# Patient Record
Sex: FEMALE | Race: WHITE | Employment: FULL TIME | URBAN - METROPOLITAN AREA
[De-identification: names, ages, dates, MRNs, and addresses within clinical notes are randomized per-mention and may not be internally consistent; named-entity substitution may affect disease eponyms.]

---

## 2017-01-25 ENCOUNTER — HOSPITAL ENCOUNTER (OUTPATIENT)
Dept: PHYSICAL THERAPY | Age: 47
Discharge: HOME | End: 2017-01-25
Payer: COMMERCIAL

## 2017-01-25 PROCEDURE — 97033 APP MDLTY 1+IONTPHRSIS EA 15: CPT

## 2017-01-25 PROCEDURE — 97161 PT EVAL LOW COMPLEX 20 MIN: CPT

## 2017-01-25 PROCEDURE — 97140 MANUAL THERAPY 1/> REGIONS: CPT

## 2017-01-25 NOTE — PROGRESS NOTES
[address] Gle[address]  [facility]   [address]  [facility]  [facility] [address] [place], [address] - Phone: [phone]  Fax: [phone] / [address]  Patient Name: Amy Vilella : 1970   Medical   Diagnosis: Right elbow pain [M25.521] Treatment Diagnosis: R elbow pain   Onset Date: chronic     Referral Source: Shyam Gonzalez MD Start of [facility]): 2017   Prior Hospitalization: See medical history Provider #: [id number]   Prior Level of Function: Able to carry, lift, hold objects without pain   Comorbidities: Chronic R shoulder pain, mild c/s degeneration   Medications: Verified on Patient Summary List   The Plan of Care and following information is based on the information from the initial evaluation.   ===========================================================================================  Assessment / key information:  The patient is a 51 y.o. female who presents to the clinic with main c/o R elbow and UE pain. Pain level is reported as 6-7/10. Objective evaluation reveals: (1) FOTO=41. (2) FHP and increased t/s kyphosis. (3) c/s AROM: patient generally hypermobile as evident by >WNL BB, FHP limiting BROT. (4) R GHJ AROM WNL, but patient reports \"burning\" in post scapula after 5 min on computer. B elbow ROM -6-145. (5) MSTT: strong and painful ECRL and ECRB. ED WNL. (6) TrPs present in wrist extensors as well as sh mm of UT and supra/infraspinatus. Decreased pain to 2/10 following manual TPR and STM to R UE. Followed manual with iontophoresis. Patient is a good dry needling candidate. These impairments are limiting activity tolerance, patient UA to spend >5 min on computer, UA to hold a coffee cup. The patient would benefit from skilled physical therapy services in order to improve above listed impairments for return to PLOF. ===========================================================================================  Eval Complexity: History LOW Complexity : Zero comorbidities / personal factors that will impact the outcome / POC;  Examination  HIGH Complexity : 4+ Standardized tests and measures addressing body structure, function, activity limitation and / or participation in recreation ; Presentation MEDIUM Complexity : Evolving with changing characteristics ; Decision Making MEDIUM Complexity : FOTO score of 26-74; Overall Complexity LOW   Problem List: pain affecting function, decrease ROM, decrease strength, decrease ADL/ functional abilitiies and decrease activity tolerance FOTO = 41  Treatment Plan may include any combination of the following: Therapeutic exercise, Therapeutic activities, Neuromuscular re-education, Physical agent/modality, Patient education and Self Care training, and manual therapy, including myofascial trigger point dry needling (TDN). Patient / Family readiness to learn indicated by: asking questions, trying to perform skills and interest  Persons(s) to be included in education: patient (P); patient support person, NA  Barriers to Learning/Limitations: None  Measures taken: NA   Patient Goal (s): \"no pain and origination\"   Patient self reported health status: excellent  Rehabilitation Potential: good   Short Term Goals: To be accomplished in  2-3  weeks:  1. Establish HEP and consistent compliance with instructions. 2. Patient to report >25% improvement in overall symptoms. 3. Improve FOTO score to > or 48/100 to demo improved function.  Long Term Goals: To be accomplished in  4-6  weeks:  1. The patient will be independent in HEP in preparation for discharge. 2. Improve functional ability as evidenced by a score of > or = 55/100 on FOTO. 3. Patient to report >50% improvement in overall symptoms. 4. Patient will be able to hold a coffee cup without feelings of fatigue, pain or weakness.   5. The patient will report > or = 10 min on the computer before burning sensation or pain in R UE/scapular area. Frequency / Duration:   Patient to be seen  2  times per week for 4-6  weeks:  Patient / Caregiver education and instruction: self care, activity modification and exercises    Therapist Signature: Lakesha Quan PT, DPT, MTC, CMTPT Date: 2/77/6103   Certification Period: na Time: 3:20 PM   ===========================================================================================  I certify that the above Physical Therapy Services are being furnished while the patient is under my care. I agree with the treatment plan and certify that this therapy is necessary. Physician Signature:        Date:       Time:     Please sign and return to [facility] or you may fax the signed copy to [phone]. Thank you.

## 2017-01-27 NOTE — PROGRESS NOTES
PHYSICAL THERAPY - DAILY TREATMENT NOTE    Patient Name: Ami Gallagher        Date: 2017  : 1970    Patient  Verified: YES  Visit #:   1     Insurance: Payor: Felipa Aguilar / Plan: [employer] [place] [employer] / Product Type: HMO /      In time: 242 Out time: 115   Total Treatment Time: 45     Medicare Time Tracking (below)   Total Timed Codes (min):   1:1 Treatment Time:       TREATMENT AREA/ DIAGNOSIS = Right elbow pain [M25.521]    SUBJECTIVE  Pain Level (on 0 to 10 scale):  -7  / 10   Medication Changes/New allergies or changes in medical history, any new surgeries or procedures?     NO    If yes, update Summary List   Subjective Functional Status/Changes:  []  No changes reported     Please see eval.      OBJECTIVE  Modalities Rationale: [] decrease edema/ inflammation  [] decrease pain   [] increase tissue extensibility  [] increase muscle performance  [] decrease neural compromise  to improve patient's ability to [] perform ADLs   [] ambulate  [] perform work  [] relaxation/ sleep      min [] Estim, type/location:                                      []  att     []  unatt     []  w/US     []  w/ice    []  w/heat    min []  Mechanical Traction: type/lbs                   []  pro   []  sup   []  int   []  cont    []  before manual    []  after manual    min []  Ultrasound, settings/location:     5 min [x]  Iontophoresis w/ dexamethasone, location: R wrist extensors                                              [x]  take home patch       []  in clinic    min []  Ice     []  Heat    location/position:     min []  Vasopneumatic Device, press/temp:     min []  Other:    [] Skin assessment post-treatment (if applicable):    []  intact    []  redness- no adverse reaction     []redness  adverse reaction:          min Therapeutic Exercise:  [x]  See flow sheet   Rationale:  [] increase ROM   [] increase strength   [] increase endurance   [] increase motor control   [] other  to improve patients ability to [] perform ADLs   [] ambulate  [] perform work  [] relaxation/ sleep      15 min Manual Therapy: Technique:         [] STM[x]ASTM[]TPR[]PROM[] Stretching  []Jt manipulation []Gr I [] II []  III [] IV[] V[]  Treatment Area:  R forarm, UT, infraspinatus  Other: manual TPR   Rationale: [x] decrease pain   [x] decrease TP [x] increase ROM/ mobility   [] increase tissue extensibility   [] decrease edema   [] reduce disc [] postural correction   [] other     to improve patient's ability to [x] perform ADLs   [] ambulate  [] perform work  [] relaxation/ sleep       min Therapeutic Activity:  [] see flow sheet   Rationale:[] increase ROM   [] increase strength   [] increase balance/ proprioception   [] increase motor control   [] other   to improve patients ability to [] perform ADLs   [] ambulate  [] perform work  [] relaxation/ sleep      min Neuromuscular Re-ed:  [] see flow sheet   Rationale:[] increase ROM   [] increase strength   [] increase balance/ proprioception   [] increase motor control  [] improve safety  [] other    to improve patients ability to[] perform ADLs   [] ambulate  [] perform work  [] relaxation/ sleep                min Gait Training: To improve patients ability to:    [] perform ADLs   [] ambulate       min Patient Education:  Yes    [x] Reviewed HEP   []  Progressed/Changed HEP based on: Other Objective/Functional Measures:    Please see POC. Post Treatment Pain Level (on 0 to 10) scale:   2  / 10     ASSESSMENT  []  See Progress Note/Recertification      Patient will continue to benefit from skilled PT services to:  PLEASE SEE POC/SET GOALS.    Progress toward goals / Updated goals:    [] decr pain   []inc stability   []inc balance [] inc ROM[] inc strength  [] centralizing symptoms                    [] progressing  Function  [] progressing towards LTGs            []  progressing HEP       PLAN  [x]  Upgrade activities as tolerated YES Continue plan of care   []  Discharge due to : []  Other:      Therapist: Audley Mortimer, PT, DPT, MTC, CMTPT    Date: 1/27/2017 Time: 4:21 PM        Future Appointments  Date Time Provider Nicolette Santoro   1/31/2017 9:00 AM John Alvarado PT [facility]   2/2/2017 9:00 AM John Alvarado PT [facility] AT [facility]   2/7/2017 10:00 AM John Alvarado PT [facility] AT [facility]   2/14/2017 2:30 PM John Alvarado PT [facility] AT [facility]   2/21/2017 9:00 AM John Alvarado PT [facility] AT [facility]   2/28/2017 9:00 AM John Alvarado PT [facility] AT [facility]

## 2017-01-31 ENCOUNTER — HOSPITAL ENCOUNTER (OUTPATIENT)
Dept: PHYSICAL THERAPY | Age: 47
Discharge: HOME | End: 2017-01-31
Payer: COMMERCIAL

## 2017-01-31 PROCEDURE — 97140 MANUAL THERAPY 1/> REGIONS: CPT

## 2017-01-31 PROCEDURE — 97110 THERAPEUTIC EXERCISES: CPT

## 2017-01-31 NOTE — PROGRESS NOTES
PHYSICAL THERAPY - DAILY TREATMENT NOTE      Patient Name: Alcon Bynum        Date: 2017  : 1970   YES Patient  Verified  Visit #:   2   of     Insurance: Payor: Joey Rangel / Plan: [address] [place] [employer] / Product Type: HMO /      In time: 9:00 Out time: 10:05   Total Treatment Time: 65     Medicare Time Tracking (below)   Total Timed Codes (min):   1:1 Treatment Time:       TREATMENT AREA = Right elbow pain [M25.521]    SUBJECTIVE    Pain Level (on 0 to 10 scale):  3  / 10   Medication Changes/New allergies or changes in medical history, any new surgeries or procedures? NO    If yes, update Summary List   Subjective Functional Status/Changes:  []  No changes reported     \"Working on the computer, using my phone, and reaching for things bothers it. \"       OBJECTIVE    25 min Therapeutic Exercise:  [x]  See flow sheet   Rationale:      increase ROM and increase strength to improve the patients ability to work on the computer       30 min Manual Therapy:  TrPR R infraspinatus, wrist extensors, Stretching wrist extensors   Rationale:      decrease pain, increase ROM, increase tissue extensibility and decrease trigger points to improve patient's ability to work on the computer    Modalities Rationale:     decrease pain to improve patient's ability to work on the computer      min [] Estim, type/location:                                      []  att     []  unatt     []  w/US     []  w/ice    []  w/heat    min []  Mechanical Traction: type/lbs                   []  pro   []  sup   []  int   []  cont    []  before manual    []  after manual    min []  Ultrasound, settings/location:      min []  Iontophoresis w/ dexamethasone, location:                                               []  take home patch       []  in clinic   10 min []  Ice     [x]  Heat    location/position: R shoulder; supine    min []  Vasopneumatic Device, press/temp:     min []  Other:    [] Skin assessment post-treatment (if applicable):    [x]  intact    []  redness- no adverse reaction     []redness  adverse reaction:       min Patient Education:  YES  Reviewed HEP   []  Progressed/Changed HEP based on: Other Objective/Functional Measures: TrPs in infraspinatus reproduced pain down R UE     Post Treatment Pain Level (on 0 to 10) scale:   0  / 10     ASSESSMENT    Assessment/Changes in Function:     Pt with sig TrPs in infraspinatus which are likely contributing to UE pain. TrPs also present in wrist extensors. Pt is a prime TDN candidate-awaiting MD signature on POC. []  See Progress Note/Recertification   Patient will continue to benefit from skilled PT services to modify and progress therapeutic interventions, address functional mobility deficits, address ROM deficits, address strength deficits, analyze and address soft tissue restrictions, analyze and cue movement patterns, analyze and modify body mechanics/ergonomics, assess and modify postural abnormalities and instruct in home and community integration to attain remaining goals. to attain remaining goals.    Progress toward goals / Updated goals:    Establish HEP and consistent compliance with instructions-met     PLAN    []  Upgrade activities as tolerated YES Continue plan of care   []  Discharge due to :    []  Other:      Therapist: Marichuy Del Rio PT    Date: 1/31/2017 Time: 9:01 AM   Future Appointments  Date Time Provider Nicolette Santoro   2/2/2017 9:00 AM [facility]   2/10/2017 8:30 AM [facility]   2/14/2017 2:30 PM [facility]   2/21/2017 9:00 AM [facility]   2/28/2017 9:00 AM Mar[facility] Del Rio [facility]

## 2017-02-02 ENCOUNTER — HOSPITAL ENCOUNTER (OUTPATIENT)
Dept: PHYSICAL THERAPY | Age: 47
Discharge: HOME | End: 2017-02-02
Payer: COMMERCIAL

## 2017-02-02 PROCEDURE — 97140 MANUAL THERAPY 1/> REGIONS: CPT

## 2017-02-02 PROCEDURE — 97110 THERAPEUTIC EXERCISES: CPT

## 2017-02-02 NOTE — PROGRESS NOTES
PHYSICAL THERAPY - DAILY TREATMENT NOTE      Patient Name: Nasima Glez        Date: 2017  : 1970   YES Patient  Verified  Visit #:   3   of     Insurance: Payor: Mario Harris / Plan: [employer] [place] HMO / Product Type: HMO /      In time: 9:00 Out time: 9:35   Total Treatment Time: 35     Medicare Time Tracking (below)   Total Timed Codes (min):   1:1 Treatment Time:       TREATMENT AREA = Right elbow pain [M25.521]    SUBJECTIVE    Pain Level (on 0 to 10 scale):  4  / 10   Medication Changes/New allergies or changes in medical history, any new surgeries or procedures? NO    If yes, update Summary List   Subjective Functional Status/Changes:  []  No changes reported     \"It's feeling a little better today. \"       OBJECTIVE    20 min Therapeutic Exercise:  [x]  See flow sheet   Rationale:      increase ROM and increase strength to improve the patients ability to work on the computer       15 min Manual Therapy:  TDN R infraspinatus   Rationale:      decrease pain, increase ROM, increase tissue extensibility and decrease trigger points to improve patient's ability to work on the computer       min Patient Education:  Mely Bethea   []  Progressed/Changed HEP based on: Other Objective/Functional Measures: Other Objective/Functional Measures:    Dry Needling Procedure Note    Dry Needle Session Number:  1    Procedure: An intramuscular manual therapy (dry needling) and a neuro-muscular re-education treatment was done to deactivate myofascial trigger points, with a 30/50 and 30/30 gauge solid filament needle, under aseptic technique.     Indication(s): [] Muscle spasms [] Myalgia/Myositis  [] Muscle cramps      [] Muscle imbalances [] TMD (TMJ) [x] Myofascial pain & dysfunction     [] Other: _muscle stiffness_    TIMEOUT PERFORMED:  9:20 (enter time the timeout was completed)   Marielos Ponce (enter who was present)    Informed Consent Obtained: [x] Verbal  [x] Written (obtained at first needling session)  The following items were reviewed with the patient:   Purpose of dry needling, side effects, possible complications, and the informed consent    The need to report the use of blood thinners and/or immunosuppressant medications    How to respond to possible adverse effects of the treatment   Self treatment of post needling soreness: heat (moist heat, heat wraps) and stretching   Opportunity was given to ask any questions, all questions were answered    Treatment:  The following muscles were treated today:    Right: infraspinatus   Left: n/a     Patients response to todays treatment:   [x]  LTRs  [x]  Muscle Relaxation  [x]  Pain Relief  []  Decreased Tinnitus  []  Decreased HAs [x]  Post needling soreness []  Increased ROM   []  Other:             Post Treatment Pain Level (on 0 to 10) scale:   0  / 10     ASSESSMENT    Assessment/Changes in Function:     TDN to R infraspinatus reproduced wrist and elbow pain. Decreased pain following treatment. Plan to needle R wrist extensors next session if pain hasn't resolved. []  See Progress Note/Recertification   Patient will continue to benefit from skilled PT services to modify and progress therapeutic interventions, address functional mobility deficits, address ROM deficits, address strength deficits, analyze and address soft tissue restrictions, analyze and cue movement patterns and instruct in home and community integration to attain remaining goals. to attain remaining goals.    Progress toward goals / Updated goals:    Patient to report >25% improvement in overall symptoms-progressing     PLAN    []  Upgrade activities as tolerated YES Continue plan of care   []  Discharge due to :    []  Other:      Therapist: Radha Flannery PT    Date: 2/2/2017 Time: 8:56 AM   Future Appointments  Date Time Provider Nicolette Santoro   2/2/2017 9:00 AM Radha Flannery PT [facility]   2/10/2017 8:30 AM Radha Flannery PT [facility] 2/14/2017 2:30 PM Vlad Diaz PT [facility] AT [facility]   2/21/2017 9:00 AM Vlad Diaz PT [facility]   2/28/2017 9:00 AM Vlad Diaz PT [facility]

## 2017-02-07 ENCOUNTER — APPOINTMENT (OUTPATIENT)
Dept: PHYSICAL THERAPY | Age: 47
End: 2017-02-07
Payer: COMMERCIAL

## 2017-02-10 ENCOUNTER — HOSPITAL ENCOUNTER (OUTPATIENT)
Dept: PHYSICAL THERAPY | Age: 47
Discharge: HOME | End: 2017-02-10
Payer: COMMERCIAL

## 2017-02-10 PROCEDURE — 97110 THERAPEUTIC EXERCISES: CPT

## 2017-02-10 PROCEDURE — 97140 MANUAL THERAPY 1/> REGIONS: CPT

## 2017-02-10 NOTE — PROGRESS NOTES
PHYSICAL THERAPY - DAILY TREATMENT NOTE      Patient Name: Viola Spear        Date: 2/10/2017  : 1970   YES Patient  Verified  Visit #:   4   of     Insurance: Payor: Coby Rich / Plan: [place] / Product Type: HMO /      In time: 8:40 Out time: 9:15   Total Treatment Time: 35     Medicare Time Tracking (below)   Total Timed Codes (min):   1:1 Treatment Time:       TREATMENT AREA = Right elbow pain [M25.521]    SUBJECTIVE    Pain Level (on 0 to 10 scale):  4  / 10   Medication Changes/New allergies or changes in medical history, any new surgeries or procedures? NO    If yes, update Summary List   Subjective Functional Status/Changes:  []  No changes reported     \"My arm felt a lot better after last time but my elbow is really hurting today. \"       OBJECTIVE    10 min Therapeutic Exercise:  [x]  See flow sheet   Rationale:      increase ROM and increase strength to improve the patients ability to reach, complete work duties       25 min Manual Therapy:  TDN R wrist extensors, infraspinatus   Rationale:      decrease pain, increase ROM, increase tissue extensibility and decrease trigger points to improve patient's ability to reach, complete work duties     min Patient Education:  YES  Reviewed HEP   []  Progressed/Changed HEP based on: Other Objective/Functional Measures: Other Objective/Functional Measures:    Dry Needling Procedure Note    Dry Needle Session Number:  2    Procedure: An intramuscular manual therapy (dry needling) and a neuro-muscular re-education treatment was done to deactivate myofascial trigger points, with a 30/30 gauge solid filament needle, under aseptic technique.     Indication(s): [] Muscle spasms [] Myalgia/Myositis  [] Muscle cramps      [] Muscle imbalances [] TMD (TMJ) [x] Myofascial pain & dysfunction     [] Other: _muscle stiffness_    TIMEOUT PERFORMED:  8:40 (enter time the timeout was completed)   Chato Hdez (gray who was present)    Informed Consent Obtained: [x] Verbal  [x] Written (obtained at first needling session)  The following items were reviewed with the patient:   Purpose of dry needling, side effects, possible complications, and the informed consent    The need to report the use of blood thinners and/or immunosuppressant medications    How to respond to possible adverse effects of the treatment   Self treatment of post needling soreness: heat (moist heat, heat wraps) and stretching   Opportunity was given to ask any questions, all questions were answered    Treatment:  The following muscles were treated today:    Right: Wrist extensors, infraspinatus   Left: n/a     Patients response to todays treatment:   [x]  LTRs  [x]  Muscle Relaxation  [x]  Pain Relief  []  Decreased Tinnitus  []  Decreased HAs [x]  Post needling soreness []  Increased ROM   []  Other:             Post Treatment Pain Level (on 0 to 10) scale:   0 sore  / 10     ASSESSMENT    Assessment/Changes in Function:     Decreased elbow pain after TDN to wrist extensors. []  See Progress Note/Recertification   Patient will continue to benefit from skilled PT services to modify and progress therapeutic interventions, address functional mobility deficits, address ROM deficits, address strength deficits, analyze and address soft tissue restrictions, analyze and cue movement patterns, analyze and modify body mechanics/ergonomics, assess and modify postural abnormalities and instruct in home and community integration to attain remaining goals. to attain remaining goals.    Progress toward goals / Updated goals:    Pt compliant with HEP     PLAN    []  Upgrade activities as tolerated YES Continue plan of care   []  Discharge due to :    []  Other:      Therapist: Jonah Freedman PT    Date: 2/10/2017 Time: 8:41 AM   Future Appointments  Date Time Provider Nicolette Santoro   2/14/2017 2:30 PM [facility]   2/21/2017 9:00 AM Katelin Dela Cruz Una Shane, PT [facility] AT [facility]   2/28/2017 9:00 AM Sonal Szymanski, PT [facility] AT [facility]

## 2017-02-14 ENCOUNTER — HOSPITAL ENCOUNTER (OUTPATIENT)
Dept: PHYSICAL THERAPY | Age: 47
Discharge: HOME | End: 2017-02-14
Payer: COMMERCIAL

## 2017-02-14 PROCEDURE — 97110 THERAPEUTIC EXERCISES: CPT

## 2017-02-14 PROCEDURE — 97140 MANUAL THERAPY 1/> REGIONS: CPT

## 2017-02-14 NOTE — PROGRESS NOTES
PHYSICAL THERAPY - DAILY TREATMENT NOTE      Patient Name: Natasha Claire        Date: 2017  : 1970   YES Patient  Verified  Visit #:   5   of     Insurance: Payor: Logan Pulliam / Plan: [employer] HMO / Product Type: HMO /      In time: 2:30 Out time: 3:00   Total Treatment Time: 30     Medicare Time Tracking (below)   Total Timed Codes (min):   1:1 Treatment Time:       TREATMENT AREA = Right elbow pain [M25.521]    SUBJECTIVE    Pain Level (on 0 to 10 scale):  3  / 10   Medication Changes/New allergies or changes in medical history, any new surgeries or procedures? NO    If yes, update Summary List   Subjective Functional Status/Changes:  []  No changes reported     \"My shoulder feels pretty good but my elbow still hurts. My hand doesn't feel as numb but I can tell it's weak. \"       OBJECTIVE    15 min Therapeutic Exercise:  [x]  See flow sheet   Rationale:      increase ROM and increase strength to improve the patients ability to complete work duties       15 min Manual Therapy:  TDN R triceps   Rationale:      decrease pain, increase ROM, increase tissue extensibility and decrease trigger points to improve patient's ability to complete work duties       min Patient Education:  YES  Reviewed HEP   []  Progressed/Changed HEP based on: Other Objective/Functional Measures: Other Objective/Functional Measures:    Dry Needling Procedure Note    Dry Needle Session Number:  3    Procedure: An intramuscular manual therapy (dry needling) and a neuro-muscular re-education treatment was done to deactivate myofascial trigger points, with a 30/50 gauge solid filament needle, under aseptic technique.     Indication(s): [] Muscle spasms [] Myalgia/Myositis  [] Muscle cramps      [] Muscle imbalances [] TMD (TMJ) [x] Myofascial pain & dysfunction     [] Other: _muscle stiffness_    TIMEOUT PERFORMED:  2:45 (enter time the timeout was completed)   Toyin Stern (enter who was present)    Informed Consent Obtained: [x] Verbal  [x] Written (obtained at first needling session)  The following items were reviewed with the patient:   Purpose of dry needling, side effects, possible complications, and the informed consent    The need to report the use of blood thinners and/or immunosuppressant medications    How to respond to possible adverse effects of the treatment   Self treatment of post needling soreness: heat (moist heat, heat wraps) and stretching   Opportunity was given to ask any questions, all questions were answered    Treatment:  The following muscles were treated today:    Right: triceps   Left: n/a     Patients response to todays treatment:   [x]  LTRs  [x]  Muscle Relaxation  [x]  Pain Relief  []  Decreased Tinnitus  []  Decreased HAs [x]  Post needling soreness []  Increased ROM   []  Other:             Post Treatment Pain Level (on 0 to 10) scale:   3  / 10     ASSESSMENT    Assessment/Changes in Function:     Chief c/o elbow pain at this time. Slightly decreased with TDN to triceps. Decreased treatment time due to pt time constraint. TDN to wrist extensors next session. []  See Progress Note/Recertification   Patient will continue to benefit from skilled PT services to modify and progress therapeutic interventions, address functional mobility deficits, address ROM deficits, address strength deficits, analyze and address soft tissue restrictions, analyze and cue movement patterns, analyze and modify body mechanics/ergonomics, assess and modify postural abnormalities and instruct in home and community integration to attain remaining goals. to attain remaining goals.    Progress toward goals / Updated goals:    Pt compliant with HEP     PLAN    []  Upgrade activities as tolerated YES Continue plan of care   []  Discharge due to :    []  Other:      Therapist: Ofelia Covarrubias PT    Date: 2/14/2017 Time: 2:46 PM   Future Appointments  Date Time Provider Nicolette Santoro   2/21/2017 9:00 DALIA [place], PT [facility]   2/28/2017 9:00 DALIA [place], PT [facility] AT [facility]

## 2017-02-28 ENCOUNTER — HOSPITAL ENCOUNTER (OUTPATIENT)
Dept: PHYSICAL THERAPY | Age: 47
Discharge: HOME | End: 2017-02-28
Payer: COMMERCIAL

## 2017-02-28 PROCEDURE — 97110 THERAPEUTIC EXERCISES: CPT

## 2017-02-28 PROCEDURE — 97140 MANUAL THERAPY 1/> REGIONS: CPT

## 2017-02-28 NOTE — PROGRESS NOTES
PHYSICAL THERAPY - DAILY TREATMENT NOTE      Patient Name: Shelby Mclaughlin        Date: 2017  : 1970   YES Patient  Verified  Visit #:   6   of     Insurance: Payor: Sergo Duncan / Plan: Sammy Arrieta HMO / Product Type: HMO /      In time: 8:55 Out time: 9:35   Total Treatment Time: 40     Medicare Time Tracking (below)   Total Timed Codes (min):   1:1 Treatment Time:       TREATMENT AREA = Right elbow pain [M25.521]    SUBJECTIVE    Pain Level (on 0 to 10 scale):  3  / 10   Medication Changes/New allergies or changes in medical history, any new surgeries or procedures? NO    If yes, update Summary List   Subjective Functional Status/Changes:  []  No changes reported     \"My hand and shoulder feel great but my elbow still hurts. \"       OBJECTIVE    25 min Therapeutic Exercise:  [x]  See flow sheet   Rationale:      increase strength to improve the patients ability to complete ADLs       15 min Manual Therapy:  TDN R wrist extensors and supraspinatus   Rationale:      decrease pain, increase ROM, increase tissue extensibility and decrease trigger points to improve patient's ability to complete ADLs       min Patient Education:  YES  Reviewed HEP   []  Progressed/Changed HEP based on: Other Objective/Functional Measures: Other Objective/Functional Measures:    Dry Needling Procedure Note    Dry Needle Session Number:  4    Procedure: An intramuscular manual therapy (dry needling) and a neuro-muscular re-education treatment was done to deactivate myofascial trigger points, with a 30/50 gauge solid filament needle, under aseptic technique.     Indication(s): [] Muscle spasms [] Myalgia/Myositis  [] Muscle cramps      [] Muscle imbalances [] TMD (TMJ) [x] Myofascial pain & dysfunction     [] Other: _muscle stiffness_    TIMEOUT PERFORMED:  8:55 (enter time the timeout was completed)   Erik Bobby (enter who was present)    Informed Consent Obtained: [x] Verbal  [x] Written (obtained at first needling session)  The following items were reviewed with the patient:   Purpose of dry needling, side effects, possible complications, and the informed consent    The need to report the use of blood thinners and/or immunosuppressant medications    How to respond to possible adverse effects of the treatment   Self treatment of post needling soreness: heat (moist heat, heat wraps) and stretching   Opportunity was given to ask any questions, all questions were answered    Treatment:  The following muscles were treated today:    Right: Supraspinatus, wrist extensors (proximal)   Left: n/a     Patients response to todays treatment:   [x]  LTRs  [x]  Muscle Relaxation  [x]  Pain Relief  []  Decreased Tinnitus  []  Decreased HAs [x]  Post needling soreness []  Increased ROM   []  Other:             Post Treatment Pain Level (on 0 to 10) scale:   0  / 10     ASSESSMENT    Assessment/Changes in Function:     Pt initially had elbow pain at end range elbow extension but abolished after TDN. Progressed strengthening exercises with good tolerance. []  See Progress Note/Recertification   Patient will continue to benefit from skilled PT services to modify and progress therapeutic interventions, address functional mobility deficits, address strength deficits, analyze and address soft tissue restrictions, analyze and cue movement patterns, analyze and modify body mechanics/ergonomics, assess and modify postural abnormalities and instruct in home and community integration to attain remaining goals. to attain remaining goals.    Progress toward goals / Updated goals:    See PN     PLAN    []  Upgrade activities as tolerated YES Continue plan of care   []  Discharge due to :    []  Other:      Therapist: Pascual Tanner PT    Date: 2/28/2017 Time: 8:57 AM   Future Appointments  Date Time Provider Nicolette Santoro   2/28/2017 9:00 AM Pascual Tanner PT [facility]

## 2017-02-28 NOTE — PROGRESS NOTES
[address]   PHYSICAL THERAPY AT [address], [address] [place] [address], [address], [address] [place] [address]  Phone: [phone]  Fax: [phone]  PROGRESS NOTE  Patient Name: Xander Van : 1970   Treatment/Medical Diagnosis: Right elbow pain [M25.521]   Referral Source: Alfreda De La Fuente MD     Date of Initial Visit: 17 Attended Visits: 6 Missed Visits: 1     SUMMARY OF TREATMENT  Physical therapy has consisted of therapeutic exercise and neuromuscular re-education for R UE strengthening, manual therapy including DTM, TrPR, and TDN, pt education for activity modification and HEP, and moist heat and iontophoresis for pain relief. CURRENT STATUS  Pt has made excellent progress and reports 60% overall improvement in sx. She reports today with 0/10 pain in R shoulder and hand but 3/10 pain in R elbow. She is responding well to dry needling, and we were able to abolish pain after today's session. Pt would benefit from continued PT to improve UE strengthening and educate in long term HEP for sx management. Previous Goals:  1. The patient will be independent in HEP in preparation for discharge. 2. Improve functional ability as evidenced by a score of > or = 55/100 on FOTO. 3. Patient to report >50% improvement in overall symptoms. 4. Patient will be able to hold a coffee cup without feelings of fatigue, pain or weakness.   5. The patient will report > or = 10 min on the computer before burning sensation or pain in R UE/scapular area   Prior Level/Current Level:  1) Prior Level: n/a   Current Level: independent in current HEP   Goal Met? progressing  2) Prior Level: 41   Current Level: not assessed   Goal Met? ongoing  3) Prior Level: n/a   Current Level: 60%   Goal Met? yes  4) Prior Level: unable   Current Level: able to hold without fatigue or weakness; some elbow pain   Goal Met? partially  5) Prior Level: pain instantly with computer work   Current Level: able to work on the computer for short amounts of time without pain   Goal Met? yes    New Goals to be achieved in __3-4__  weeks:  1. The patient will be independent in HEP in preparation for discharge. 2. Improve functional ability as evidenced by a score of > or = 55/100 on FOTO. 3. Patient will be able to hold a coffee cup without feelings of fatigue, pain or weakness. 4. Pt will demonstrate full and pain free R elbow AROM flex and ext    RECOMMENDATIONS  Continue PT 1x/week for 3-4 weeks  If you have any questions/comments please contact us directly at [phone]. Thank you for allowing us to assist in the care of your patient. Therapist Signature: Vlad Diaz PT, DPT Date: 2/28/2017     Time: 9:42 AM   NOTE TO PHYSICIAN:  PLEASE COMPLETE THE ORDERS BELOW AND FAX TO   [facility]: [phone]. If you are unable to process this request in 24 hours please contact our office: [phone].    ___ I have read the above report and request that my patient continue as recommended.   ___ I have read the above report and request that my patient continue therapy with the following changes/special instructions:_________________________________________________________   ___ I have read the above report and request that my patient be discharged from therapy.      Physician Signature:        Date:       Time:

## 2017-04-26 NOTE — PROGRESS NOTES
[facility] PHYSICAL THERAPY AT [address], [address] [place] [address], [address], [address] [place] [address]  Phone: [phone]  Fax: [phone] SUMMARY  Patient Name: Alejandrina Ko : 1970   Treatment/Medical Diagnosis: Right elbow pain [M25.521]   Referral Source: Avi Hinds MD     Date of Initial Visit: 17 Attended Visits: 6 Missed Visits: 1     SUMMARY OF TREATMENT  Physical therapy has consisted of therapeutic exercise and neuromuscular re-education for R UE strengthening, manual therapy including DTM, TrPR, and TDN, pt education for activity modification and HEP, and moist heat and iontophoresis for pain relief. CURRENT STATUS  Pt did not return to PT after 6th visit on 17. Pt was re-assessed at last visit, and information below is based on that re-assessment. Previous Goals:  1. The patient will be independent in HEP in preparation for discharge. 2. Improve functional ability as evidenced by a score of > or = 55/100 on FOTO. 3. Patient to report >50% improvement in overall symptoms. 4. Patient will be able to hold a coffee cup without feelings of fatigue, pain or weakness. 5. The patient will report > or = 10 min on the computer before burning sensation or pain in R UE/scapular area    Prior Level/Current Level:  1) Prior Level: n/a  Current Level: independent in current HEP  Goal Met? progressing    2) Prior Level: 41  Current Level: not assessed  Goal Met? ongoing    3) Prior Level: n/a  Current Level: 60%  Goal Met? yes    4) Prior Level: unable  Current Level: able to hold without fatigue or weakness; some elbow pain  Goal Met? partially    5) Prior Level: pain instantly with computer work  Current Level: able to work on the computer for short amounts of time without pain  Goal Met? yes    RECOMMENDATIONS  Discontinue therapy due to lack of attendance or compliance.   If you have any questions/comments please contact us directly at [phone]. Thank you for allowing us to assist in the care of your patient.     Therapist Signature: Sun Kulkarni PT, DPT Date: 4/26/17     Time: 3:42 PM